# Patient Record
Sex: FEMALE | Race: AMERICAN INDIAN OR ALASKA NATIVE
[De-identification: names, ages, dates, MRNs, and addresses within clinical notes are randomized per-mention and may not be internally consistent; named-entity substitution may affect disease eponyms.]

---

## 2018-07-23 ENCOUNTER — HOSPITAL ENCOUNTER (EMERGENCY)
Dept: HOSPITAL 5 - ED | Age: 28
Discharge: HOME | End: 2018-07-23
Payer: COMMERCIAL

## 2018-07-23 VITALS — SYSTOLIC BLOOD PRESSURE: 114 MMHG | DIASTOLIC BLOOD PRESSURE: 74 MMHG
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
## 2018-07-23 DIAGNOSIS — S13.4XXA: Primary | ICD-10-CM
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
## 2018-07-23 DIAGNOSIS — S46.911A: ICD-10-CM

## 2018-07-23 DIAGNOSIS — J45.909: ICD-10-CM
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
## 2018-07-23 PROCEDURE — 72040 X-RAY EXAM NECK SPINE 2-3 VW: CPT

## 2018-07-23 PROCEDURE — 99283 EMERGENCY DEPT VISIT LOW MDM: CPT
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
## 2018-07-23 NOTE — XRAY REPORT
FINAL REPORT



EXAM:  XR SPINE CERVICAL 2-3V



HISTORY:  neck pain 



TECHNIQUE:  Six views of cervical spine.



PRIORS:  None currently available.



FINDINGS:  

Mild grade 1 anterior subluxation at C5-C6. Disc spaces are

uniform. Nonspecific straightening of the normal lordotic

alignment. Prevertebral soft tissues are unremarkable.



C1-C2 articulation is not clearly evident.



No scoliosis. No suspicious osseous lesions. No vertebral

anomalies.



IMPRESSION:  

C1-C2 articulation not clearly evident.



Nonspecific straightening of the normal lordotic alignment.



Suspect disc disease at C5-C6.
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
## 2018-07-23 NOTE — XRAY REPORT
FINAL REPORT



EXAM:  XR HUMERUS 2+V RT



HISTORY:  Right shoulder/arm pain 



TECHNIQUE:  Two views of the right humerus.



PRIORS:  None currently available.



FINDINGS:  

There is no acute fracture. There is no evidence for healing

fracture.



There is no acute dislocation.



There is no cortical destruction to suggest osteomyelitis.



There are no suspicious osseous lesions.



There are no radiopaque foreign objects.



IMPRESSION:  

No acute osseous findings.